# Patient Record
Sex: MALE | URBAN - METROPOLITAN AREA
[De-identification: names, ages, dates, MRNs, and addresses within clinical notes are randomized per-mention and may not be internally consistent; named-entity substitution may affect disease eponyms.]

---

## 2021-08-03 ENCOUNTER — OFFICE VISIT (OUTPATIENT)
Dept: URBAN - METROPOLITAN AREA CLINIC 30 | Facility: CLINIC | Age: 61
End: 2021-08-03
Payer: COMMERCIAL

## 2021-08-03 DIAGNOSIS — E11.9 TYPE 2 DIABETES MELLITUS W/O COMPLICATION: Primary | ICD-10-CM

## 2021-08-03 DIAGNOSIS — H25.813 COMBINED FORMS OF AGE-RELATED CATARACT, BILATERAL: ICD-10-CM

## 2021-08-03 DIAGNOSIS — H04.123 DRY EYE SYNDROME OF BILATERAL LACRIMAL GLANDS: ICD-10-CM

## 2021-08-03 DIAGNOSIS — H18.513 FUCHS' DYSTROPHY: ICD-10-CM

## 2021-08-03 PROCEDURE — 92004 COMPRE OPH EXAM NEW PT 1/>: CPT | Performed by: OPTOMETRIST

## 2021-08-03 PROCEDURE — 92134 CPTRZ OPH DX IMG PST SGM RTA: CPT | Performed by: OPTOMETRIST

## 2021-08-03 ASSESSMENT — VISUAL ACUITY
OD: 20/20
OS: 20/20

## 2021-08-03 ASSESSMENT — INTRAOCULAR PRESSURE
OS: 13
OD: 13

## 2021-08-03 ASSESSMENT — KERATOMETRY
OD: 43.87
OS: 42.66

## 2021-08-03 NOTE — IMPRESSION/PLAN
Impression: Dry eye syndrome of bilateral lacrimal glands: H04.123. Plan: Rec regular use of ATs BID-QID OU. AT sample given to pt today.

## 2021-08-03 NOTE — IMPRESSION/PLAN
Impression: Type 2 diabetes mellitus w/o complication: P95.2. Plan: No retinopathy on exam today. Patient educated on importance of BG control. Monitor annually. Patient referred by Dr. Alcira Ojeda. Patient was on metformin 2/2020-2/2021. Discontinued by Dr Alcira Ojeda. Diet controlled at this time.

## 2021-08-03 NOTE — IMPRESSION/PLAN
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 Plan: Patient educated. Monitor for now. Discussed tx options in event condition worsens. Patient's mother had corneal transplant.